# Patient Record
Sex: FEMALE | Race: OTHER
[De-identification: names, ages, dates, MRNs, and addresses within clinical notes are randomized per-mention and may not be internally consistent; named-entity substitution may affect disease eponyms.]

---

## 2020-10-13 ENCOUNTER — HOSPITAL ENCOUNTER (EMERGENCY)
Dept: HOSPITAL 56 - MW.ED | Age: 20
Discharge: HOME | End: 2020-10-13
Payer: MEDICAID

## 2020-10-13 DIAGNOSIS — Z32.01: Primary | ICD-10-CM

## 2020-10-13 NOTE — EDM.PDOC
ED HPI GENERAL MEDICAL PROBLEM





- General


Chief Complaint: OB/GYN Problem


Stated Complaint: PELVIC PAIN


Time Seen by Provider: 10/13/20 12:26


Source of Information: Reports: Patient


History Limitations: Reports: No Limitations, Language Barrier (Interview 

accomplished with the assistance of a trained )





- History of Present Illness


INITIAL COMMENTS - FREE TEXT/NARRATIVE: 





Presents reporting pregnancy.  The patient states her LMP was September 5, 2020.

 Yesterday she took 3 pregnancy tests and they were all positive.  She has been 

sexually active not on birth control.  Today she would like confirmation of 

pregnancy.  She also reports that she has been having "period cramping" more 

intense than usual off and on.  No vaginal bleeding, pelvic pain or abdominal 

pain.  Mild nausea but no vomiting.  No fever, dysuria.  She is otherwise 

healthy without chronic medical problems.  She has never been pregnant before.


  ** pelvic


Pain Score (Numeric/FACES): 7





- Related Data


                                    Allergies











Allergy/AdvReac Type Severity Reaction Status Date / Time


 


No Known Allergies Allergy   Verified 10/13/20 12:24











Home Meds: 


                                    Home Meds





. [No Known Home Meds]  10/13/20 [History]











Past Medical History





- Past Health History


Medical/Surgical History: Denies Medical/Surgical History





- Infectious Disease History


Infectious Disease History: Reports: None





Social & Family History





- Family History


Family Medical History: Noncontributory





- Tobacco Use


Tobacco Use Status *Q: Never Tobacco User





- Recreational Drug Use


Recreational Drug Use: No





ED ROS GENERAL





- Review of Systems


Review Of Systems: Comprehensive ROS is negative, except as noted in HPI.





ED EXAM PREGNANCY





- Physical Exam


Exam: See Below


Exam Limited By: No Limitations


General Appearance: Alert, No Apparent Distress


Ears: Normal External Exam


Nose: Normal Inspection


Throat/Mouth: Normal Inspection


Head: Atraumatic, Normocephalic


Neck: Normal Inspection


Respiratory/Chest: No Respiratory Distress, Lungs Clear, Normal Breath Sounds


Cardiovascular: Normal Peripheral Pulses, Regular Rate, Rhythm


GI/Abdominal Exam: Normal Bowel Sounds, Soft, No Distention, Tender (Mild in 

pelvic and epigastric area)





Course





- Vital Signs


Last Recorded V/S: 


                                Last Vital Signs











Temp      


 


Pulse  122 H  10/13/20 12:25


 


Resp  18   10/13/20 12:25


 


BP  129/74   10/13/20 12:25


 


Pulse Ox  100   10/13/20 12:25














- Orders/Labs/Meds


Labs: 


                                Laboratory Tests











  10/13/20 10/13/20 Range/Units





  12:43 12:43 


 


Urine Color  YELLOW   


 


Urine Appearance  CLEAR   


 


Urine pH  8.0   (5.0-8.0)  


 


Ur Specific Gravity  1.020   (1.001-1.035)  


 


Urine Protein  NEGATIVE   (NEGATIVE)  mg/dL


 


Urine Glucose (UA)  NEGATIVE   (NEGATIVE)  mg/dL


 


Urine Ketones  NEGATIVE   (NEGATIVE)  mg/dL


 


Urine Occult Blood  NEGATIVE   (NEGATIVE)  


 


Urine Nitrite  NEGATIVE   (NEGATIVE)  


 


Urine Bilirubin  NEGATIVE   (NEGATIVE)  


 


Urine Urobilinogen  0.2   (<2.0)  EU/dL


 


Ur Leukocyte Esterase  NEGATIVE   (NEGATIVE)  


 


Urine RBC  0-2   (0-2/HPF)  


 


Urine WBC  0-2   (0-5/HPF)  


 


Ur Epithelial Cells  OCCASIONAL   (NONE-FEW)  


 


Urine Bacteria  RARE   (NEGATIVE)  


 


Urine HCG, Qual   POSITIVE  (NEGATIVE)  














- Re-Assessments/Exams


Free Text/Narrative Re-Assessment/Exam: 





10/13/20 13:27


Now, the patient states that she is not having cramping all the time just once 

in a while she gets a cramp.





Departure





- Departure


Time of Disposition: 13:28


Disposition: Home, Self-Care 01


Condition: Good


Clinical Impression: 


 Pregnancy confirmed by positive urine test








- Discharge Information


Referrals: 


PCP,None [Primary Care Provider] - 


[facility] [Outside]


Yuriy Krishnamurthy MD [Physician] - 


Forms:  ED Department Discharge


Additional Instructions: 


The following information is given to patients seen in the emergency department 

who are being discharged to home. This information is to outline your options 

for follow-up care. We provide all patients seen in our emergency department 

with a follow-up referral.





The need for follow-up, as well as the timing and circumstances, are variable 

depending upon the specifics of your emergency department visit.





If you don't have a primary care physician on staff, we will provide you with a 

referral. We always advise you to contact your personal physician following an 

emergency department visit to inform them of the circumstance of the visit and 

for follow-up with them and/or the need for any referrals to a consulting 

specialist.





The emergency department will also refer you to a specialist when appropriate. 

This referral assures that you have the opportunity for follow-up care with a 

specialist. All of these measure are taken in an effort to provide you with 

optimal care, which includes your follow-up.





Under all circumstances we always encourage you to contact your private 

physician who remains a resource for coordinating your care. When calling for 

follow-up care, please make the office aware that this follow-up is from your 

recent emergency room visit. If for any reason you are refused follow-up, please

 contact the [facility] Emergency 

Department at [phone] and asked to speak to the emergency department 

charge nurse.





1.  If you experience vaginal bleeding along with pelvic pain, report to the 

emergency room promptly


2.  Make an appointment for prenatal care with one of the providers listed 

above.





Sepsis Event Note (ED)





- Evaluation


Sepsis Screening Result: No Definite Risk





- Focused Exam


Vital Signs: 


                                   Vital Signs











  Pulse Resp BP Pulse Ox


 


 10/13/20 12:25  122 H  18  129/74  100

## 2020-10-23 ENCOUNTER — HOSPITAL ENCOUNTER (EMERGENCY)
Dept: HOSPITAL 56 - MW.ED | Age: 20
LOS: 1 days | Discharge: HOME | End: 2020-10-24
Payer: SELF-PAY

## 2020-10-23 DIAGNOSIS — O20.0: Primary | ICD-10-CM

## 2020-10-23 LAB
BUN SERPL-MCNC: 9 MG/DL (ref 7–18)
CHLORIDE SERPL-SCNC: 101 MMOL/L (ref 98–107)
CO2 SERPL-SCNC: 26.7 MMOL/L (ref 21–32)
GLUCOSE SERPL-MCNC: 90 MG/DL (ref 74–106)
POTASSIUM SERPL-SCNC: 3.9 MMOL/L (ref 3.5–5.1)
SODIUM SERPL-SCNC: 135 MMOL/L (ref 136–145)

## 2020-10-23 PROCEDURE — 86901 BLOOD TYPING SEROLOGIC RH(D): CPT

## 2020-10-23 PROCEDURE — 76817 TRANSVAGINAL US OBSTETRIC: CPT

## 2020-10-23 PROCEDURE — 84702 CHORIONIC GONADOTROPIN TEST: CPT

## 2020-10-23 PROCEDURE — 81001 URINALYSIS AUTO W/SCOPE: CPT

## 2020-10-23 PROCEDURE — 86900 BLOOD TYPING SEROLOGIC ABO: CPT

## 2020-10-23 PROCEDURE — 36415 COLL VENOUS BLD VENIPUNCTURE: CPT

## 2020-10-23 PROCEDURE — 86850 RBC ANTIBODY SCREEN: CPT

## 2020-10-23 PROCEDURE — 80053 COMPREHEN METABOLIC PANEL: CPT

## 2020-10-23 PROCEDURE — 99284 EMERGENCY DEPT VISIT MOD MDM: CPT

## 2020-10-23 PROCEDURE — 85025 COMPLETE CBC W/AUTO DIFF WBC: CPT

## 2020-10-23 NOTE — US
INDICATION:



Vaginal bleeding



TECHNIQUE:



Ultrasound OB pelvis transvaginal.  Real-time gray-scale imaging of the 

pelvis was performed.



COMPARISON:



None available



FINDINGS:



There is an intrauterine gestational sac containing a fetal pole with a 

crown-rump length of 2.6 mm, corresponding to 5 weeks and 6 days. A yolk 

sac is seen, measuring 4-5 mm. There is fetal cardiac activity with a heart 

rate of 110 BPM. There is a small irregular hypoechoic area adjacent to the 

gestational sac suggestive of a subchorionic hemorrhage. The right ovary 

measures 2.6 x 1.9 x 1.1 cm and the left ovary measures 3.1 x 2.5 x 2.3 cm. 

There is a probable corpus luteum in the left ovary. No significant free 

fluid is seen. 



IMPRESSION:



A single live early intrauterine gestation at 5 weeks and 6 days by 

crown-rump length. Relative fetal bradycardia could be related to the early 

gestational age. An apparent small subchorionic hemorrhage. Recommend close 

follow-up.



Dictated by Carlos Andrea MD @ Oct 23 2020 11:15PM



Signed by Dr. Carlos Andrea @ Oct 23 2020 11:21PM

## 2021-02-19 NOTE — EDM.PDOC
ED HPI GENERAL MEDICAL PROBLEM





- General


Chief Complaint: OB/GYN Problem


Stated Complaint: VAGINAL BLEEDING


Time Seen by Provider: 10/23/20 23:09


Source of Information: Reports: Patient


History Limitations: Reports: No Limitations





- History of Present Illness


INITIAL COMMENTS - FREE TEXT/NARRATIVE: 





19-year-old female , LMP 9/10/2020 presents with vaginal bleeding that star

porfirio tonight.  She denies fever, chills, vomiting, dysuria, passing clots, pelvic

pain.  She admits to nausea.  She has an appointment with [facility] on 

Monday.  Presents with vaginal bleeding





ROS: A 10-point review of systems, other than pertinent positives and negatives 

as stated per HPI, is otherwise negative





Past medical history: No additional pertinent history


Past Surgical history: No additional pertinent history


Social history: No additional pertinent history


Family history: No additional pertinent history


________________________________________________________________________________





PHYSICAL EXAM





General: AOx4, GCS = 15, No distress


HEENT: dry mucous membrane


Neck: supple, no meningismus, no Kernig or Brudzinski


Cardiac: S1S2 RRR


Respiratory: CTAB, no crackles or rales, no wheezing


Abdomen: Soft, nontender, no rebound or guarding, nondistended, no pulsatile 

mass.


Back: nontender


: deferred


Musculoskeletal: NVI distally, no deformity


Neuro: No focal deficits, CN 2 - 12 WNL.











- Related Data


                                    Allergies











Allergy/AdvReac Type Severity Reaction Status Date / Time


 


No Known Allergies Allergy   Verified 10/23/20 23:40











Home Meds: 


                                    Home Meds





. [No Known Home Meds]  10/23/20 [History]











ED ROS GENERAL





- Review of Systems


Review Of Systems: Comprehensive ROS is negative, except as noted in HPI. (see 

dictation)





ED EXAM, GENERAL





- Physical Exam


Exam: See Below (see dictation)





Course





- Vital Signs


Last Recorded V/S: 


                                Last Vital Signs











Temp  96.7 F L  10/24/20 00:54


 


Pulse  107 H  10/24/20 00:54


 


Resp  17   10/24/20 00:54


 


BP  100/68   10/24/20 00:54


 


Pulse Ox  99   10/23/20 23:21














- Orders/Labs/Meds


Labs: 


                                Laboratory Tests











  10/23/20 10/23/20 10/23/20 Range/Units





  22:00 22:00 22:00 


 


WBC   10.89   (4.0-11.0)  K/uL


 


RBC   4.49   (4.30-5.90)  M/uL


 


Hgb   13.8   (12.0-16.0)  g/dL


 


Hct   41.0   (36.0-46.0)  %


 


MCV   91.3   (80.0-98.0)  fL


 


MCH   30.7   (27.0-32.0)  pg


 


MCHC   33.7   (31.0-37.0)  g/dL


 


RDW Std Deviation   43.0   (28.0-62.0)  fl


 


RDW Coeff of Vinny   13   (11.0-15.0)  %


 


Plt Count   294   (150-400)  K/uL


 


MPV   9.30   (7.40-12.00)  fL


 


Neut % (Auto)   65.0   (48.0-80.0)  %


 


Lymph % (Auto)   25.3   (16.0-40.0)  %


 


Mono % (Auto)   9.0   (0.0-15.0)  %


 


Eos % (Auto)   0.5   (0.0-7.0)  %


 


Baso % (Auto)   0.2   (0.0-1.5)  %


 


Neut # (Auto)   7.1 H   (1.4-5.7)  K/uL


 


Lymph # (Auto)   2.8 H   (0.6-2.4)  K/uL


 


Mono # (Auto)   1.0 H   (0.0-0.8)  K/uL


 


Eos # (Auto)   0.1   (0.0-0.7)  K/uL


 


Baso # (Auto)   0.0   (0.0-0.1)  K/uL


 


Nucleated RBC %   0.0   /100WBC


 


Nucleated RBCs #   0   K/uL


 


Sodium    135 L  (136-145)  mmol/L


 


Potassium    3.9  (3.5-5.1)  mmol/L


 


Chloride    101  ()  mmol/L


 


Carbon Dioxide    26.7  (21.0-32.0)  mmol/L


 


BUN    9  (7.0-18.0)  mg/dL


 


Creatinine    0.6  (0.6-1.0)  mg/dL


 


Est Cr Clr Drug Dosing    TNP  


 


Estimated GFR (MDRD)    > 60.0  ml/min


 


Glucose    90  ()  mg/dL


 


Calcium    8.8  (8.5-10.1)  mg/dL


 


Total Bilirubin    0.2  (0.2-1.0)  mg/dL


 


AST    16  (15-37)  IU/L


 


ALT    18  (14-63)  IU/L


 


Alkaline Phosphatase    73  ()  U/L


 


Total Protein    6.9  (6.4-8.2)  g/dL


 


Albumin    3.7  (3.4-5.0)  g/dL


 


Globulin    3.2  (2.6-4.0)  g/dL


 


Albumin/Globulin Ratio    1.2  (0.9-1.6)  


 


HCG, Quant     mIU/mL


 


Urine Color     


 


Urine Appearance     


 


Urine pH     (5.0-8.0)  


 


Ur Specific Gravity     (1.001-1.035)  


 


Urine Protein     (NEGATIVE)  mg/dL


 


Urine Glucose (UA)     (NEGATIVE)  mg/dL


 


Urine Ketones     (NEGATIVE)  mg/dL


 


Urine Occult Blood     (NEGATIVE)  


 


Urine Nitrite     (NEGATIVE)  


 


Urine Bilirubin     (NEGATIVE)  


 


Urine Urobilinogen     (<2.0)  EU/dL


 


Ur Leukocyte Esterase     (NEGATIVE)  


 


Urine RBC     (0-2/HPF)  


 


Urine WBC     (0-5/HPF)  


 


Ur Epithelial Cells     (NONE-FEW)  


 


Urine Bacteria     (NEGATIVE)  


 


Blood Type  O NEGATIVE    


 


Antibody Screen  NEGATIVE    


 


Rhogam Indicated  YES    














  10/23/20 10/23/20 Range/Units





  22:00 23:30 


 


WBC    (4.0-11.0)  K/uL


 


RBC    (4.30-5.90)  M/uL


 


Hgb    (12.0-16.0)  g/dL


 


Hct    (36.0-46.0)  %


 


MCV    (80.0-98.0)  fL


 


MCH    (27.0-32.0)  pg


 


MCHC    (31.0-37.0)  g/dL


 


RDW Std Deviation    (28.0-62.0)  fl


 


RDW Coeff of Vinny    (11.0-15.0)  %


 


Plt Count    (150-400)  K/uL


 


MPV    (7.40-12.00)  fL


 


Neut % (Auto)    (48.0-80.0)  %


 


Lymph % (Auto)    (16.0-40.0)  %


 


Mono % (Auto)    (0.0-15.0)  %


 


Eos % (Auto)    (0.0-7.0)  %


 


Baso % (Auto)    (0.0-1.5)  %


 


Neut # (Auto)    (1.4-5.7)  K/uL


 


Lymph # (Auto)    (0.6-2.4)  K/uL


 


Mono # (Auto)    (0.0-0.8)  K/uL


 


Eos # (Auto)    (0.0-0.7)  K/uL


 


Baso # (Auto)    (0.0-0.1)  K/uL


 


Nucleated RBC %    /100WBC


 


Nucleated RBCs #    K/uL


 


Sodium    (136-145)  mmol/L


 


Potassium    (3.5-5.1)  mmol/L


 


Chloride    ()  mmol/L


 


Carbon Dioxide    (21.0-32.0)  mmol/L


 


BUN    (7.0-18.0)  mg/dL


 


Creatinine    (0.6-1.0)  mg/dL


 


Est Cr Clr Drug Dosing    


 


Estimated GFR (MDRD)    ml/min


 


Glucose    ()  mg/dL


 


Calcium    (8.5-10.1)  mg/dL


 


Total Bilirubin    (0.2-1.0)  mg/dL


 


AST    (15-37)  IU/L


 


ALT    (14-63)  IU/L


 


Alkaline Phosphatase    ()  U/L


 


Total Protein    (6.4-8.2)  g/dL


 


Albumin    (3.4-5.0)  g/dL


 


Globulin    (2.6-4.0)  g/dL


 


Albumin/Globulin Ratio    (0.9-1.6)  


 


HCG, Quant  77037.0   mIU/mL


 


Urine Color   YELLOW  


 


Urine Appearance   SLT CLOUDY  


 


Urine pH   6.0  (5.0-8.0)  


 


Ur Specific Gravity   >= 1.030  (1.001-1.035)  


 


Urine Protein   NEGATIVE  (NEGATIVE)  mg/dL


 


Urine Glucose (UA)   NEGATIVE  (NEGATIVE)  mg/dL


 


Urine Ketones   NEGATIVE  (NEGATIVE)  mg/dL


 


Urine Occult Blood   SMALL H  (NEGATIVE)  


 


Urine Nitrite   NEGATIVE  (NEGATIVE)  


 


Urine Bilirubin   NEGATIVE  (NEGATIVE)  


 


Urine Urobilinogen   0.2  (<2.0)  EU/dL


 


Ur Leukocyte Esterase   NEGATIVE  (NEGATIVE)  


 


Urine RBC   0-2  (0-2/HPF)  


 


Urine WBC   0-2  (0-5/HPF)  


 


Ur Epithelial Cells   MANY  (NONE-FEW)  


 


Urine Bacteria   RARE  (NEGATIVE)  


 


Blood Type    


 


Antibody Screen    


 


Rhogam Indicated    














- Re-Assessments/Exams


Free Text/Narrative Re-Assessment/Exam: 





10/24/20 0059


After prolonged observation in the ER, the patient improved and is currently 

stable for discharge. I performed a repeat exam and did not appreciate new 

abnormal findings. Patient exhibits normal vital signs and has a normal gait on 

road test. I advised the patient to return to the ER for reevaluation if 

symptoms worsened, including fever, worsening pain, or any other worrisome 

symptoms. I instructed the patient to follow up with [facility] on Monday. 





________________________________________________________________________________





MEDICAL DECISION MAKING: I reviewed the patients past medical records, lab and 

radiographic findings. I discussed the case with the patient. My differential 

diagnosis included: Miscarriage, ectopic pregnancy, UTI.  Her UA did not 

demonstrate a UTI.  Her ultrasound demonstrated a single live IUP GA 5 weeks and

 6 days, with subchorionic hemorrhage.  No ectopic pregnancy noted.  I informed 

patient of the risk for threatened miscarriage and I educated her on pelvic 

rest.  Patient is Rh-, she was given RhoGam in the ER.




















Departure





- Departure


Time of Disposition: 23:11


Disposition: Home, Self-Care 01


Condition: Good


Clinical Impression: 


 Threatened 








- Discharge Information


*PRESCRIPTION DRUG MONITORING PROGRAM REVIEWED*: Not Applicable


*COPY OF PRESCRIPTION DRUG MONITORING REPORT IN PATIENT SALLIE: Not Applicable


Instructions:  Threatened Miscarriage, Activity Restriction During Pregnancy


Referrals: 


PCP,None [Primary Care Provider] - 


Forms:  ED Department Discharge


Additional Instructions: 


The need for follow-up, as well as the timing and circumstances, are variable 

depending upon the specifics of your emergency department visit.





If you don't have a primary care physician on staff, we will provide you with a 

referral. We always advise you to contact your personal physician following an 

emergency department visit to inform them of the circumstance of the visit and 

for follow-up with them and/or the need for any referrals to a consulting 

specialist.





The emergency department will also refer you to a specialist when appropriate. 

This referral assures that you have the opportunity for follow-up care with a 

specialist. All of these measure are taken in an effort to provide you with 

optimal care, which includes your follow-up.





Under all circumstances we always encourage you to contact your private 

physician who remains a resource for coordinating your care. When calling for 

follow-up care, please make the office aware that this follow-up is from your 

recent emergency room visit. If for any reason you are refused follow-up, please

contact the [facility] Emergency Department

at [phone] and asked to speak to the emergency department charge nurse.





If you do not have a primary care doctor, please follow up with the clinics 

below within 3-5 days. 


OB/GYN clinics


[facility]


[address] [place] [id number]


Phone: [phone]


Fax: [phone]





[facility]


[address] [place] [address]


Phone: [phone]


Fax: [phone]








Sepsis Event Note (ED)





- Focused Exam


Vital Signs: 


                                   Vital Signs











  Temp Temp Pulse Resp BP Pulse Ox


 


 10/24/20 00:54  96.7 F L   107 H  17  100/68 


 


 10/24/20 00:47  96.7 F L   94  17  110/67 


 


 10/23/20 23:21   96.8 F L  107 H  17  105/61  99
skin normal color for race, warm, dry and intact.

## 2021-06-07 ENCOUNTER — HOSPITAL ENCOUNTER (INPATIENT)
Dept: HOSPITAL 56 - MW.OBCHECK | Age: 21
LOS: 2 days | Discharge: HOME | End: 2021-06-09
Attending: OBSTETRICS & GYNECOLOGY | Admitting: OBSTETRICS & GYNECOLOGY
Payer: MEDICAID

## 2021-06-07 DIAGNOSIS — Z3A.38: ICD-10-CM

## 2021-06-07 PROCEDURE — 4A1HXCZ MONITORING OF PRODUCTS OF CONCEPTION, CARDIAC RATE, EXTERNAL APPROACH: ICD-10-PCS | Performed by: OBSTETRICS & GYNECOLOGY

## 2021-06-07 PROCEDURE — U0002 COVID-19 LAB TEST NON-CDC: HCPCS

## 2021-06-07 PROCEDURE — 10907ZC DRAINAGE OF AMNIOTIC FLUID, THERAPEUTIC FROM PRODUCTS OF CONCEPTION, VIA NATURAL OR ARTIFICIAL OPENING: ICD-10-PCS | Performed by: OBSTETRICS & GYNECOLOGY

## 2021-06-07 NOTE — PCM.PRNOTE
- Free Text/Narrative


Note: 





Anes Note





Patient requests epidural fo L&D. Sitting position. Level L3-L4 midline 

approach. Sterile technique. Chloaprep scrub to lumbar area.





Sterile fenestrated drape applied. Epidural space easily achieved single attempt

with ease using DYLAN technique. 





DYLAN at 3 cm. Cah threaded 5 cm with ease. Cath secured a t skin using sterile 

clear adhesive dressing.





1610 Test 3 cc 1.5% lido with epi negative.





1614 Load 10 cc 0.2% ropiviciane with 1 mcg cc fentanyl in slow divided doses.





1617 Pump started with 190 cc same solution. Rate is 8 cc hr with 6 cc q 20 min 

prn bolus. 





Rogelio well.





Time with patient [id number]





Fadi Ornelas CRNA

## 2021-06-07 NOTE — PCM.PREANE
Preanesthetic Assessment





- Anesthesia/Transfusion/Family Hx


Anesthesia History: No Prior Anesthesia


Family History of Anesthesia Reaction: No





- Physical Assessment


NPO Status Date: 06/07/21


NPO Status Time: 11:00


Height: 1.61 m


Weight: 58.06 kg


ASA Class: 2





- Lab


Values: 





                             Laboratory Last Values











WBC  14.31 K/uL (4.0-11.0)  H  06/07/21  14:30    


 


RBC  4.18 M/uL (4.30-5.90)  L  06/07/21  14:30    


 


Hgb  12.4 g/dL (12.0-16.0)   06/07/21  14:30    


 


Hct  37.0 % (36.0-46.0)   06/07/21  14:30    


 


MCV  88.5 fL (80.0-98.0)   06/07/21  14:30    


 


MCH  29.7 pg (27.0-32.0)   06/07/21  14:30    


 


MCHC  33.5 g/dL (31.0-37.0)   06/07/21  14:30    


 


RDW Std Deviation  44.3 fl (28.0-62.0)   06/07/21  14:30    


 


RDW Coeff of Alex  14 % (11.0-15.0)   06/07/21  14:30    


 


Plt Count  315 K/uL (150-400)   06/07/21  14:30    


 


MPV  10.60 fL (7.40-12.00)   06/07/21  14:30    


 


Nucleated RBC %  0.0 /100WBC  06/07/21  14:30    


 


Nucleated RBCs #  0 K/uL  06/07/21  14:30    


 


SARS-CoV-2 RNA (JEREMIE)  NEGATIVE  (NEGATIVE)   06/07/21  14:30    














- Allergies


Allergies/Adverse Reactions: 


                                    Allergies











Allergy/AdvReac Type Severity Reaction Status Date / Time


 


No Known Allergies Allergy   Verified 03/18/21 09:14














- Acknowledgements


Anesthesia Type Planned: Epidural


Pt an Appropriate Candidate for the Planned Anesthesia: Yes


Alternatives and Risks of Anesthesia Discussed w Pt/Guardian: Yes


Pt/Guardian Understands and Agrees with Anesthesia Plan: Yes





PreAnesthesia Questionnaire





- Past Health History


Medical/Surgical History: Denies Medical/Surgical History


OB/GYN History: Reports: Pregnancy





- Infectious Disease History


Infectious Disease History: Reports: None





- SUBSTANCE USE


Tobacco Use Status *Q: Never Tobacco User


Second Hand Smoke Exposure: No


Recreational Drug Use History: No





- HOME MEDS


Home Medications: 


                                    Home Meds





. [No Known Home Meds]  10/13/20 [History]


Pnv No.95/Ferrous Fum/Folic AC [Prenatal Multivitamin Tablet] 1 each PO DAILY 

03/17/21 [History]











- CURRENT (IN HOUSE) MEDS


Current Meds: 





                               Current Medications





Butorphanol Tartrate (Butorphanol 1 Mg/Ml Sdv)  1 mg IVPUSH Q1H PRN


   PRN Reason: Pain (severe 7-10)


Carboprost Tromethamine (Carboprost Tromethamine 250 Mcg/1 Ml Amp)  250 mcg IM 

ASDIRECTED PRN


   PRN Reason: Post Partum Hemorrhage


Oxytocin/Sodium Chloride (Oxytocin 30 Unit/500 Ml-Ns)  30 unit in 500 mls @ 500 

mls/hr IV TITRATE CORTEZ


Tranexamic Acid 1,000 mg/ (Sodium Chloride)  110 mls @ 660 mls/hr IV ONETIME PRN


   PRN Reason: Bleeding


Lactated Ringer's (Ringers, Lactated)  1,000 mls @ 150 mls/hr IV ASDIRECTED [facility]


   Last Infusion: 06/07/21 15:35 Dose:  999 mls/hr


   Documented by: 


Lidocaine HCl (Lidocaine 1% 50 Ml Mdv)  50 ml INJECT ONETIME PRN


   PRN Reason: Laceration repair


Methylergonovine Maleate (Methylergonovine 0.2 Mg/1 Ml Amp)  0.2 mg IM 

ASDIRECTED PRN


   PRN Reason: Post Partum Hemorrhage


Misoprostol (Misoprostol 200 Mcg Tab)  200 mcg PO ONETIME PRN


   PRN Reason: Post Partum Hemorrhage


Nalbuphine HCl (Nalbuphine 10 Mg/1 Ml Vial)  10 mg IVPUSH Q1H PRN


   PRN Reason: Pain (severe 7-10)


Sodium Chloride (Sodium Chloride 0.9% 10 Ml Syringe)  10 ml FLUSH ASDIRECTED PRN


   PRN Reason: Keep Vein Open


Sodium Chloride (Sodium Chloride 0.9% 2.5 Ml Syringe)  2.5 ml FLUSH ASDIRECTED 

PRN


   PRN Reason: Keep Vein Open


Sodium Chloride (Sodium Chloride 0.9% 10 Ml Sdv)  10 ml IV ASDIRECTED PRN


   PRN Reason: IV Use


Sterile Water (Water For Irrigation,Sterile 1,000 Ml Container)  1,000 ml IRR 

ASDIRECTED PRN


   PRN Reason: delivery





Discontinued Medications





Ampicillin Sodium (Ampicillin 2 Gm Vial) Confirm Administered Dose 2 gm .ROUTE 

.STK-MED ONE


   Stop: 06/07/21 14:50


Fentanyl (Fentanyl 100 Mcg/2 Ml Sdv) Confirm Administered Dose 200 mcg .ROUTE 

.STK-MED ONE


   Stop: 06/07/21 15:56


Ampicillin Sodium 2 gm/ Sodium (Chloride)  100 mls @ 200 mls/hr IV ONETIME ONE


   Stop: 06/07/21 14:51


   Last Admin: 06/07/21 14:50 Dose:  200 mls/hr


   Documented by: 


Ropivacaine (Naropin 0.2%) Confirm Administered Dose 200 mls @ as directed 

.ROUTE .STK-MED ONE


   Stop: 06/07/21 15:56

## 2021-06-07 NOTE — PCM.DEL
L & D Note





- General Info


Date of Service: 21





- Delivery Note


Delivery Outcome: Livebirth


Infant Delivery Method: Spontaneous Vaginal Delivery-Single


Birth Presentation: Left Occiput Anterior (CHASTITY)


Nuchal Cord: None


Anesthesia Type: Epidural


Amniotic Fluid Description: Clear


Episiotomy Type: None


Laceration: Labial


Suture type: Vicryl


Suture size: 3-0


Placenta: Intact


Cord: 3 Vessels


Estimated Blood Loss: 300


Resuscitation Needed: No


APGAR Score 1 min: 9


APGAR Score 5 min: 9


Delivery Comments (Free Text/Narrative):: 


Live male  delivered at  , apgar 9/9 weight pending 








- General Info


Date of Service: 21





- Patient Data


Weight - Most Recent: 58.06 kg


Lab Results Last 24 Hours: 


                         Laboratory Results - last 24 hr











  21 Range/Units





  14:30 14:30 14:30 


 


WBC  14.31 H    (4.0-11.0)  K/uL


 


RBC  4.18 L    (4.30-5.90)  M/uL


 


Hgb  12.4    (12.0-16.0)  g/dL


 


Hct  37.0    (36.0-46.0)  %


 


MCV  88.5    (80.0-98.0)  fL


 


MCH  29.7    (27.0-32.0)  pg


 


MCHC  33.5    (31.0-37.0)  g/dL


 


RDW Std Deviation  44.3    (28.0-62.0)  fl


 


RDW Coeff of Alex  14    (11.0-15.0)  %


 


Plt Count  315    (150-400)  K/uL


 


MPV  10.60    (7.40-12.00)  fL


 


Nucleated RBC %  0.0    /100WBC


 


Nucleated RBCs #  0    K/uL


 


SARS-CoV-2 RNA ([place])    NEGATIVE  (NEGATIVE)  


 


Blood Type   O NEGATIVE   


 


Antibody Screen   POSITIVE   


 


Antibody Identification   Anti-D   











Med Orders - Current: 


                               Current Medications





Butorphanol Tartrate (Butorphanol 1 Mg/Ml Sdv)  1 mg IVPUSH Q1H PRN


   PRN Reason: Pain (severe 7-10)


Carboprost Tromethamine (Carboprost Tromethamine 250 Mcg/1 Ml Amp)  250 mcg IM 

ASDIRECTED PRN


   PRN Reason: Post Partum Hemorrhage


Oxytocin/Sodium Chloride (Oxytocin 30 Unit/500 Ml-Ns)  30 unit in 500 mls @ 500 

mls/hr IV TITRATE [facility]


   Last Admin: 21 20:05 Dose:  500 mls/hr


   Documented by: 


Lactated Ringer's (Ringers, Lactated)  1,000 mls @ 150 mls/hr IV ASDIRECTED [facility]


   Last Infusion: 21 18:00 Dose:  150 mls/hr


   Documented by: 


Lidocaine HCl (Lidocaine 1% 50 Ml Mdv)  50 ml INJECT ONETIME PRN


   PRN Reason: Laceration repair


Sodium Chloride (Sodium Chloride 0.9% 10 Ml Syringe)  10 ml FLUSH ASDIRECTED PRN


   PRN Reason: Keep Vein Open


Sodium Chloride (Sodium Chloride 0.9% 2.5 Ml Syringe)  2.5 ml FLUSH ASDIRECTED 

PRN


   PRN Reason: Keep Vein Open


Sodium Chloride (Sodium Chloride 0.9% 10 Ml Sdv)  10 ml IV ASDIRECTED PRN


   PRN Reason: IV Use


Sterile Water (Water For Irrigation,Sterile 1,000 Ml Container)  1,000 ml IRR 

ASDIRECTED PRN


   PRN Reason: delivery





Discontinued Medications





Ampicillin Sodium (Ampicillin 2 Gm Vial) Confirm Administered Dose 2 gm .ROUTE 

.STK-MED ONE


   Stop: 21 14:50


Fentanyl (Fentanyl 100 Mcg/2 Ml Sdv) Confirm Administered Dose 200 mcg .ROUTE 

.STK-MED ONE


   Stop: 21 15:56


Tranexamic Acid 1,000 mg/ (Sodium Chloride)  110 mls @ 660 mls/hr IV ONETIME PRN


   PRN Reason: Bleeding


Ampicillin Sodium 2 gm/ Sodium (Chloride)  100 mls @ 200 mls/hr IV ONETIME ONE


   Stop: 21 14:51


   Last Admin: 21 14:50 Dose:  200 mls/hr


   Documented by: 


Ropivacaine (Naropin 0.2%) Confirm Administered Dose 200 mls @ as directed 

.ROUTE .STK-MED ONE


   Stop: 21 15:56


Ampicillin Sodium 1 gm/ Sodium (Chloride)  50 mls @ 100 mls/hr IV Q4H [facility]


   Last Admin: 21 18:52 Dose:  100 mls/hr


   Documented by: 


Methylergonovine Maleate (Methylergonovine 0.2 Mg/1 Ml Amp)  0.2 mg IM 

ASDIRECTED PRN


   PRN Reason: Post Partum Hemorrhage


Misoprostol (Misoprostol 200 Mcg Tab)  200 mcg PO ONETIME PRN


   PRN Reason: Post Partum Hemorrhage


Nalbuphine HCl (Nalbuphine 10 Mg/1 Ml Vial)  10 mg IVPUSH Q1H PRN


   PRN Reason: Pain (severe 7-10)











- Problem List & Annotations


(1) Vaginal delivery


SNOMED Code(s): 592643985


   Code(s): O80 - ENCOUNTER FOR FULL-TERM UNCOMPLICATED DELIVERY   Status: Acute

  Current Visit: Yes   





- Problem List Review


Problem List Initiated/Reviewed/Updated: Yes





- My Orders


Last 24 Hours: 


My Active Orders





21 14:22


Patient Status [ADT] Routine 


May Shower [RC] ASDIRECTED 


Notify Provider [RC] PRN 


Up ad Vivian [RC] ASDIRECTED 


Vaginal Exam [RC] PRN 


Vital Signs [RC] PER UNIT ROUTINE 


Butorphanol [Stadol]   1 mg IVPUSH Q1H PRN 


Carboprost Tromethamine [Hemabate DS]   250 mcg IM ASDIRECTED PRN 


Lidocaine 1% [Xylocaine 1%]   50 ml INJECT ONETIME PRN 


Sodium Chloride 0.9% [Normal Saline]   10 ml IV ASDIRECTED PRN 


Sodium Chloride 0.9% [Saline Flush]   10 ml FLUSH ASDIRECTED PRN 


Sodium Chloride 0.9% [Saline Flush]   2.5 ml FLUSH ASDIRECTED PRN 


Water For Irrigation,Sterile [Sterile Water for Irrigation]   1,000 ml IRR 

ASDIRECTED PRN 


Peripheral IV Insertion Adult [OM.PC] Routine 





21 14:30


RPR (SYPHILIS SERO) W/ RFLX [REF] Routine 


Lactated Ringers [Ringers, Lactated] 1,000 ml IV ASDIRECTED 


Oxytocin/0.9 % Sodium Chloride [Oxytocin 30 Unit/500 ML-NS] 30 unit in 500 ml IV

TITRATE 





21 20:42


Patient Status [ADT] Routine 


May Shower [RC] ASDIRECTED 


Up ad Vivian [RC] ASDIRECTED 


Vital Signs [RC] PER UNIT ROUTINE 


RHIG WORKUP, POSTPARTUM [BBK] Routine 


Acetaminophen [Tylenol Extra Strength]   1,000 mg PO Q4H PRN 


Acetaminophen [Tylenol Extra Strength]   500 mg PO Q4H PRN 


Benzocaine/Menthol [Dermoplast Pain Relief 20%-0.5% Spray]   78 gm TOP 

ASDIRECTED PRN 


Docusate Sodium [Colace]   100 mg PO Q12H PRN 


Ibuprofen [Motrin]   400 mg PO Q4H PRN 


Ibuprofen [Motrin]   800 mg PO Q6H PRN 


Lanolin [Lansinoh HPA]   See Dose Instructions  TOP ASDIRECTED PRN 


bisacodyL [Dulcolax]   10 mg RECTAL ONETIME PRN 


oxyCODONE   5 mg PO Q2H PRN 


witch hazeL [Tucks]   1 pad TOP ASDIRECTED PRN 


Assess Lochia [WOMSER] Per Unit Routine 


Assess Uterine Involution [WOMSER] Per Unit Routine 


Peripheral IV Discontinue [OM.PC] Routine 


Resuscitation Status Routine 





21 05:11


HEMOGLOBIN/HEMATOCRIT,HH [HEME] Timed 














- Assessment


Assessment:: 


21yo P1 s/p  PPD0 , GBS positive received ampicillin 


 Rh negative

## 2021-06-08 NOTE — [id number]
Post Anesthesia Note





- EVALUATION WITHIN 48HRS OF ANESTHETIC


Vital Signs in Normal Range: Yes


Patient Participated in Evaluation: Yes


Respiratory Function Stable: Yes


Airway Patent: Yes


Cardiovascular Function Stable: Yes


Hydration Status Stable: Yes


Pain Control Satisfactory: Yes


Nausea and Vomiting Control Satisfactory: Yes


Mental Status Recovered: Yes


Vital Signs: 


                                Last Vital Signs











Temp  36.6 C   06/08/21 04:10


 


Pulse  93   06/08/21 04:10


 


Resp  16   06/08/21 04:10


 


BP  105/67   06/08/21 04:10


 


Pulse Ox  95   06/08/21 04:10

## 2021-06-08 NOTE — PCM.PNPP
- General Info


Date of Service: 21


Admission Dx/Problem (Free Text): 





Pregnanc


Subjective Update: 





Resting in bed with infant during rounds. Pain well controlled. Ambulating and 

voiding without difficulty. Lochia decreasing. Tolerating regular diet. 

Attempting to breastfeed, working on latch.





- General Info


Date of Service: 21





- Patient Data


Vital Signs - Most Recent: 


                                Last Vital Signs











Temp  98 F   21 04:10


 


Pulse  93   21 04:10


 


Resp  16   21 04:10


 


BP  105/67   21 04:10


 


Pulse Ox  95   21 04:10











Weight - Most Recent: 128 lb


I&O - Last 24 Hours: 


                                 Intake & Output











 21





 22:59 06:59 14:59


 


Intake Total  2 


 


Balance  2 











Lab Results - Last 24 Hours: 


                         Laboratory Results - last 24 hr











  21 Range/Units





  14:30 14:30 14:30 


 


WBC  14.31 H    (4.0-11.0)  K/uL


 


RBC  4.18 L    (4.30-5.90)  M/uL


 


Hgb  12.4    (12.0-16.0)  g/dL


 


Hct  37.0    (36.0-46.0)  %


 


MCV  88.5    (80.0-98.0)  fL


 


MCH  29.7    (27.0-32.0)  pg


 


MCHC  33.5    (31.0-37.0)  g/dL


 


RDW Std Deviation  44.3    (28.0-62.0)  fl


 


RDW Coeff of Alex  14    (11.0-15.0)  %


 


Plt Count  315    (150-400)  K/uL


 


MPV  10.60    (7.40-12.00)  fL


 


Nucleated RBC %  0.0    /100WBC


 


Nucleated RBCs #  0    K/uL


 


Cord ABG pH     (7.18-7.38)  


 


Cord ABG Base Excess     (-10--2)  


 


Cord VBG pH     (7.25-7.45)  


 


Cord VBG Base Excess     (-10--2)  


 


SARS-CoV-2 RNA ([place])    NEGATIVE  (NEGATIVE)  


 


Blood Type   O NEGATIVE   


 


Antibody Screen   POSITIVE   


 


Antibody Identification   Anti-D   


 


Fetal Screen     (NEGATIVE)  


 


RhIG Candidate?     


 


Rhogam Indicated     














  21 Range/Units





  20:04 20:04 21:03 


 


WBC     (4.0-11.0)  K/uL


 


RBC     (4.30-5.90)  M/uL


 


Hgb     (12.0-16.0)  g/dL


 


Hct     (36.0-46.0)  %


 


MCV     (80.0-98.0)  fL


 


MCH     (27.0-32.0)  pg


 


MCHC     (31.0-37.0)  g/dL


 


RDW Std Deviation     (28.0-62.0)  fl


 


RDW Coeff of Alex     (11.0-15.0)  %


 


Plt Count     (150-400)  K/uL


 


MPV     (7.40-12.00)  fL


 


Nucleated RBC %     /100WBC


 


Nucleated RBCs #     K/uL


 


Cord ABG pH  7.267    (7.18-7.38)  


 


Cord ABG Base Excess  -4    (-10--2)  


 


Cord VBG pH   7.307   (7.25-7.45)  


 


Cord VBG Base Excess   -4   (-10--2)  


 


SARS-CoV-2 RNA ([place])     (NEGATIVE)  


 


Blood Type     


 


Antibody Screen     


 


Antibody Identification     


 


Fetal Screen    NEGATIVE  (NEGATIVE)  


 


RhIG Candidate?    YES  


 


Rhogam Indicated    YES, BABY RH POS H  














  21 Range/Units





  05:18 


 


WBC   (4.0-11.0)  K/uL


 


RBC   (4.30-5.90)  M/uL


 


Hgb  10.7 L  (12.0-16.0)  g/dL


 


Hct  31.9 L  (36.0-46.0)  %


 


MCV   (80.0-98.0)  fL


 


MCH   (27.0-32.0)  pg


 


MCHC   (31.0-37.0)  g/dL


 


RDW Std Deviation   (28.0-62.0)  fl


 


RDW Coeff of Alex   (11.0-15.0)  %


 


Plt Count   (150-400)  K/uL


 


MPV   (7.40-12.00)  fL


 


Nucleated RBC %   /100WBC


 


Nucleated RBCs #   K/uL


 


Cord ABG pH   (7.18-7.38)  


 


Cord ABG Base Excess   (-10--2)  


 


Cord VBG pH   (7.25-7.45)  


 


Cord VBG Base Excess   (-10--2)  


 


SARS-CoV-2 RNA ([place])   (NEGATIVE)  


 


Blood Type   


 


Antibody Screen   


 


Antibody Identification   


 


Fetal Screen   (NEGATIVE)  


 


RhIG Candidate?   


 


Rhogam Indicated   











Med Orders - Current: 


                               Current Medications





Acetaminophen (Acetaminophen 500 Mg Tab)  500 mg PO Q4H PRN


   PRN Reason: Pain (mild 1-3)


Acetaminophen (Acetaminophen 500 Mg Tab)  1,000 mg PO Q4H PRN


   PRN Reason: Pain (mild 1-3)


Benzocaine/Menthol (Benzocaine/Menthol 20%-0.5% Spray 78 Gm Cannister)  78 gm 

TOP ASDIRECTED PRN


   PRN Reason: Perineal Comfort Measure


   Last Admin: 21 23:38 Dose:  78 gm


   Documented by: 


Bisacodyl (Bisacodyl 10 Mg Supp)  10 mg RECTAL ONETIME PRN


   PRN Reason: Constipation


Butorphanol Tartrate (Butorphanol 1 Mg/Ml Sdv)  1 mg IVPUSH Q1H PRN


   PRN Reason: Pain (severe 7-10)


Carboprost Tromethamine (Carboprost Tromethamine 250 Mcg/1 Ml Amp)  250 mcg IM 

ASDIRECTED PRN


   PRN Reason: Post Partum Hemorrhage


Docusate Sodium (Docusate Sodium 100 Mg Cap)  100 mg PO Q12H PRN


   PRN Reason: Constipation


   Last Admin: 21 23:38 Dose:  100 mg


   Documented by: 


Emollient Ointment (Lanolin 100% Cream 7 Gm Tube)  0 gm TOP ASDIRECTED PRN


   PRN Reason: Sore Nipples


   Last Admin: 21 23:37 Dose:  7 gm


   Documented by: 


Oxytocin/Sodium Chloride (Oxytocin 30 Unit/500 Ml-Ns)  30 unit in 500 mls @ 500 

mls/hr IV TITRATE [facility]


   Last Admin: 21 20:05 Dose:  500 mls/hr


   Documented by: 


Lactated Ringer's (Ringers, Lactated)  1,000 mls @ 150 mls/hr IV ASDIRECTED [facility]


   Last Infusion: 21 18:00 Dose:  150 mls/hr


   Documented by: 


Ibuprofen (Ibuprofen 400 Mg Tab)  400 mg PO Q4H PRN


   PRN Reason: Pain (mild 1-3)


Ibuprofen (Ibuprofen 800 Mg Tab)  800 mg PO Q6H PRN


   PRN Reason: Pain (mild 1-3)


   Last Admin: 21 05:59 Dose:  800 mg


   Documented by: 


Lidocaine HCl (Lidocaine 1% 50 Ml Mdv)  50 ml INJECT ONETIME PRN


   PRN Reason: Laceration repair


Oxycodone HCl (Oxycodone 5 Mg Tab)  5 mg PO Q2H PRN


   PRN Reason: Pain (severe 7-10)


Sodium Chloride (Sodium Chloride 0.9% 10 Ml Syringe)  10 ml FLUSH ASDIRECTED PRN


   PRN Reason: Keep Vein Open


   Last Admin: 21 23:41 Dose:  10 ml


   Documented by: 


Sodium Chloride (Sodium Chloride 0.9% 2.5 Ml Syringe)  2.5 ml FLUSH ASDIRECTED 

PRN


   PRN Reason: Keep Vein Open


Sodium Chloride (Sodium Chloride 0.9% 10 Ml Sdv)  10 ml IV ASDIRECTED PRN


   PRN Reason: IV Use


Sterile Water (Water For Irrigation,Sterile 1,000 Ml Container)  1,000 ml IRR 

ASDIRECTED PRN


   PRN Reason: delivery


Witch Hazel (Witch Hazel Medicated Pads 40/Jar)  1 pad TOP ASDIRECTED PRN


   PRN Reason: comfort care


   Last Admin: 21 23:38 Dose:  1 pad


   Documented by: 





Discontinued Medications





Ampicillin Sodium (Ampicillin 2 Gm Vial) Confirm Administered Dose 2 gm .ROUTE 

.STK-MED ONE


   Stop: 21 14:50


Fentanyl (Fentanyl 100 Mcg/2 Ml Sdv) Confirm Administered Dose 200 mcg .ROUTE 

.STK-MED ONE


   Stop: 21 15:56


Tranexamic Acid 1,000 mg/ (Sodium Chloride)  110 mls @ 660 mls/hr IV ONETIME PRN


   PRN Reason: Bleeding


Ampicillin Sodium 2 gm/ Sodium (Chloride)  100 mls @ 200 mls/hr IV ONETIME ONE


   Stop: 21 14:51


   Last Admin: 21 14:50 Dose:  200 mls/hr


   Documented by: 


Ropivacaine (Naropin 0.2%) Confirm Administered Dose 200 mls @ as directed 

.ROUTE .STK-MED ONE


   Stop: 21 15:56


Ampicillin Sodium 1 gm/ Sodium (Chloride)  50 mls @ 100 mls/hr IV Q4H [facility]


   Last Admin: 21 18:52 Dose:  100 mls/hr


   Documented by: 


Methylergonovine Maleate (Methylergonovine 0.2 Mg/1 Ml Amp)  0.2 mg IM 

ASDIRECTED PRN


   PRN Reason: Post Partum Hemorrhage


Misoprostol (Misoprostol 200 Mcg Tab)  200 mcg PO ONETIME PRN


   PRN Reason: Post Partum Hemorrhage


Nalbuphine HCl (Nalbuphine 10 Mg/1 Ml Vial)  10 mg IVPUSH Q1H PRN


   PRN Reason: Pain (severe 7-10)











- Infant Interaction


Infant Disposition, Postpartum:  in Room with Family


Infant Interaction: Holding Infant


Infant Feeding: Attempted Breastfeeding; Nursed Fair/Poor


Support Person: Significant Other





- Postpartum Recovery Exam


Fundal Tone: Firm


Fundal Level: At Umbilicus


Fundal Placement: Midline


Lochia Amount: Scant


Lochia Color: Rubra/Red


Perineum Description: Other (see below)


Other Perinuem Description: labial tear repaired


Episiotomy/Laceration: Approximated


Bladder Status: Voiding


Urinary Elimination: Voided





- Exam


General: Alert


Lungs: Normal Respiratory Effort


Cardiovascular: Regular Rate


GI/Abdominal Exam: Soft, Non-Tender


Extremities: Normal Inspection, Normal Range of Motion, Non-Tender, No Pedal 

Edema


Skin: Warm, Dry, Intact


Neurological: No New Focal Deficit


Psy/Mental Status: Normal Mood





- Problem List Review


Problem List Initiated/Reviewed/Updated: Yes





- Assessment


Assessment:: 


19yo  PPD1s/p  PPD0





- Plan


Plan:: 





Routine postpartum cares


* Rh negative, received Rhogam x3 during pregnancy due to 1st trimester bleeding


* Rubella immune


* GBS positive, receive Ampicillin during labor


* PO pain medications ordered PRN


* Encourage ambulation and fluid intake


* Regular diet as tolerated


* Breastfeeding, nursing assistance PRN


Dispo: stable. Anticipate discharge 24-48hrs post delivery pending 

maternal/infant status. Continue cares today.

## 2021-06-09 NOTE — PCM.PNPP
- General Info


Date of Service: 21


Admission Dx/Problem (Free Text): 





Pregnanc


Subjective Update: 





Sitting up in chair nursing infant during rounds. Pain well controlled. 

Ambulating and voiding without difficulty. Lochia decreasing. Tolerating regular

diet. Attempting to breastfeed, going well.





- General Info


Date of Service: 21





- Patient Data


Vital Signs - Most Recent: 


                                Last Vital Signs











Temp  97.3 F   21 08:00


 


Pulse  89   21 08:00


 


Resp  18   21 08:00


 


BP  105/62   21 08:00


 


Pulse Ox  98   21 08:00











Weight - Most Recent: 128 lb


Med Orders - Current: 


                               Current Medications





Acetaminophen (Acetaminophen 500 Mg Tab)  500 mg PO Q4H PRN


   PRN Reason: Pain (mild 1-3)


Acetaminophen (Acetaminophen 500 Mg Tab)  1,000 mg PO Q4H PRN


   PRN Reason: Pain (mild 1-3)


Benzocaine/Menthol (Benzocaine/Menthol 20%-0.5% Spray 78 Gm Cannister)  78 gm 

TOP ASDIRECTED PRN


   PRN Reason: Perineal Comfort Measure


   Last Admin: 21 23:38 Dose:  78 gm


   Documented by: 


Bisacodyl (Bisacodyl 10 Mg Supp)  10 mg RECTAL ONETIME PRN


   PRN Reason: Constipation


Butorphanol Tartrate (Butorphanol 1 Mg/Ml Sdv)  1 mg IVPUSH Q1H PRN


   PRN Reason: Pain (severe 7-10)


Carboprost Tromethamine (Carboprost Tromethamine 250 Mcg/1 Ml Amp)  250 mcg IM 

ASDIRECTED PRN


   PRN Reason: Post Partum Hemorrhage


Docusate Sodium (Docusate Sodium 100 Mg Cap)  100 mg PO Q12H PRN


   PRN Reason: Constipation


   Last Admin: 21 23:38 Dose:  100 mg


   Documented by: 


Emollient Ointment (Lanolin 100% Cream 7 Gm Tube)  0 gm TOP ASDIRECTED PRN


   PRN Reason: Sore Nipples


   Last Admin: 21 23:37 Dose:  7 gm


   Documented by: 


Oxytocin/Sodium Chloride (Oxytocin 30 Unit/500 Ml-Ns)  30 unit in 500 mls @ 500 

mls/hr IV TITRATE [facility]


   Last Admin: 21 20:05 Dose:  500 mls/hr


   Documented by: 


Lactated Ringer's (Ringers, Lactated)  1,000 mls @ 150 mls/hr IV ASDIRECTED [facility]


   Last Infusion: 21 18:00 Dose:  150 mls/hr


   Documented by: 


Ibuprofen (Ibuprofen 400 Mg Tab)  400 mg PO Q4H PRN


   PRN Reason: Pain (mild 1-3)


Ibuprofen (Ibuprofen 800 Mg Tab)  800 mg PO Q6H PRN


   PRN Reason: Pain (mild 1-3)


   Last Admin: 21 23:28 Dose:  800 mg


   Documented by: 


Lidocaine HCl (Lidocaine 1% 50 Ml Mdv)  50 ml INJECT ONETIME PRN


   PRN Reason: Laceration repair


Oxycodone HCl (Oxycodone 5 Mg Tab)  5 mg PO Q2H PRN


   PRN Reason: Pain (severe 7-10)


Sodium Chloride (Sodium Chloride 0.9% 10 Ml Syringe)  10 ml FLUSH ASDIRECTED PRN


   PRN Reason: Keep Vein Open


   Last Admin: 21 23:41 Dose:  10 ml


   Documented by: 


Sodium Chloride (Sodium Chloride 0.9% 2.5 Ml Syringe)  2.5 ml FLUSH ASDIRECTED 

PRN


   PRN Reason: Keep Vein Open


Sodium Chloride (Sodium Chloride 0.9% 10 Ml Sdv)  10 ml IV ASDIRECTED PRN


   PRN Reason: IV Use


Sterile Water (Water For Irrigation,Sterile 1,000 Ml Container)  1,000 ml IRR 

ASDIRECTED PRN


   PRN Reason: delivery


Witch Hazel (Witch Hazel Medicated Pads 40/Jar)  1 pad TOP ASDIRECTED PRN


   PRN Reason: comfort care


   Last Admin: 21 23:38 Dose:  1 pad


   Documented by: 





Discontinued Medications





Ampicillin Sodium (Ampicillin 2 Gm Vial) Confirm Administered Dose 2 gm .ROUTE 

.STK-MED ONE


   Stop: 21 14:50


   Last Admin: 21 10:24 Dose:  Not Given


   Documented by: 


Fentanyl (Fentanyl 100 Mcg/2 Ml Sdv) Confirm Administered Dose 200 mcg .ROUTE 

.STK-MED ONE


   Stop: 21 15:56


   Last Admin: 21 10:24 Dose:  Not Given


   Documented by: 


Tranexamic Acid 1,000 mg/ (Sodium Chloride)  110 mls @ 660 mls/hr IV ONETIME PRN


   PRN Reason: Bleeding


Ampicillin Sodium 2 gm/ Sodium (Chloride)  100 mls @ 200 mls/hr IV ONETIME ONE


   Stop: 21 14:51


   Last Admin: 21 14:50 Dose:  200 mls/hr


   Documented by: 


Ropivacaine (Naropin 0.2%) Confirm Administered Dose 200 mls @ as directed 

.ROUTE .STK-MED ONE


   Stop: 21 15:56


   Last Admin: 21 10:24 Dose:  Not Given


   Documented by: 


Ampicillin Sodium 1 gm/ Sodium (Chloride)  50 mls @ 100 mls/hr IV Q4H [facility]


   Last Admin: 21 18:52 Dose:  100 mls/hr


   Documented by: 


Methylergonovine Maleate (Methylergonovine 0.2 Mg/1 Ml Amp)  0.2 mg IM 

ASDIRECTED PRN


   PRN Reason: Post Partum Hemorrhage


Misoprostol (Misoprostol 200 Mcg Tab)  200 mcg PO ONETIME PRN


   PRN Reason: Post Partum Hemorrhage


Nalbuphine HCl (Nalbuphine 10 Mg/1 Ml Vial)  10 mg IVPUSH Q1H PRN


   PRN Reason: Pain (severe 7-10)











- Infant Interaction


Infant Disposition, Postpartum: [place] in Room with Family


Infant Interaction: Holding Infant


Infant Feeding: Attempted Breastfeeding; Nursed Fair/Poor


Support Person: Significant Other





- Postpartum Recovery Exam


Fundal Tone: Firm


Fundal Level: 1 Fingerbreadths Below Umbilicus


Fundal Placement: Midline


Lochia Amount: Scant


Lochia Color: Rubra/Red


Perineum Description: Intact, Minimal Bruising/Swelling


Other Perinuem Description: labial tear repaired


Episiotomy/Laceration: None


Bladder Status: Voiding


Urinary Elimination: Voided





- Exam


General: Alert


Lungs: Normal Respiratory Effort


Cardiovascular: Regular Rate


GI/Abdominal Exam: Soft


Extremities: Normal Range of Motion, Non-Tender, No Pedal Edema


Skin: Warm, Dry, Intact


Neurological: No New Focal Deficit


Psy/Mental Status: Normal Mood





- Problem List Review


Problem List Initiated/Reviewed/Updated: Yes





- Assessment


Assessment:: 


19yo  PPD2 s/p  





- Plan


Plan:: 





Routine postpartum cares


* Rh negative, received Rhogam x3 during pregnancy due to 1st trimester bleeding


* Rubella immune


* GBS positive, receive Ampicillin during labor


* PO pain medications ordered PRN


* Encourage ambulation and fluid intake


* Regular diet as tolerated


* Breastfeeding, nursing assistance PRN


* 


Dispo: stable. Anticipate discharge today pending maternal/infant status. Rx for

breast pump provided and discharge instructions reviewed. Patient to return to 

clinic in 4 weeks for PPV.

## 2021-06-09 NOTE — OR
SURGEON:

KRISTINE PHILLIP

 

DATE OF PROCEDURE:  2021

 

PREOPERATIVE DIAGNOSES:

20-year-old, G1, P0, full-term pregnancy, in early labor.

 

POSTOPERATIVE DIAGNOSES:

20-year-old, G1, P0, full-term pregnancy, in early labor; O negative, group B

streptococcus positive.

 

PROCEDURE:

Normal spontaneous vaginal delivery.

 

ESTIMATED BLOOD LOSS:

Two hundred.

 

IV FLUIDS:

Pitocin running.

 

NOTES AND FINDINGS:

A live male  delivered at 20:04.  Apgar scores are 9 and 9.  Weight is

3430 g.  Term pregnancy at 38 weeks 5 days.

 

BRIEF HISTORY ABOUT THE PATIENT:

She is a 20-year-old, G1, P0, __________ O negative, received RhoGAM; GBS

positive, received ampicillin during labor; came in complaining of contractions.

When she came in, she was 4 cm dilated.  She was requesting epidural which she

got.  She received ampicillin. During her labor course, she was noted to have

__________ which recovered back to the baseline.  Afterwards, she had a normal

labor call.  She became fully dilated and she was encouraged to push.

 

DESCRIPTION OF PROCEDURE:

With good pushing effort which delivered the head, subsequently by the anterior

and posterior shoulder.  The body of the baby was delivered.  Infant was placed

on maternal abdomen.  Delayed cord clamping was observed and placenta was

delivered by controlled cord traction.  Perineum was inspected.  She was noted

to have a right labial laceration which was repaired with 3-0 Vicryl interrupted

stitch.  Uterus was noted to be contracted after the procedure.  She tolerated

the procedure well.  All instrument and pad counts were correct x2.

 

 

RICKY / JAYMIE

DD:  2021 13:29:38

DT:  2021 18:23:26

Job #:  [id number]

## 2021-08-23 ENCOUNTER — HOSPITAL ENCOUNTER (EMERGENCY)
Dept: HOSPITAL 56 - MW.ED | Age: 21
Discharge: HOME | End: 2021-08-23
Payer: MEDICAID

## 2021-08-23 DIAGNOSIS — Z20.822: ICD-10-CM

## 2021-08-23 DIAGNOSIS — B34.9: Primary | ICD-10-CM

## 2021-08-23 PROCEDURE — U0002 COVID-19 LAB TEST NON-CDC: HCPCS

## 2021-08-23 NOTE — EDM.PDOC
ED HPI GENERAL MEDICAL PROBLEM





- General


Chief Complaint: Respiratory Problem


Stated Complaint: SORE THROAT, COUGH, HEADACHE


Time Seen by Provider: 08/23/21 10:46





- History of Present Illness


INITIAL COMMENTS - FREE TEXT/NARRATIVE: 





Patient presents with cough and headache and sore throat 4 days in duration.  No

fevers.  Patient received 1 dose of the Covid vaccine.  Moderate symptoms.  No 

exacerbating or alleviating factors.  No shortness of breath or productive 

sputum


  ** head pain


Pain Score (Numeric/FACES): 4





- Related Data


                                    Allergies











Allergy/AdvReac Type Severity Reaction Status Date / Time


 


No Known Allergies Allergy   Verified 08/23/21 10:57











Home Meds: 


                                    Home Meds





. [No Known Home Meds]  10/13/20 [History]


Pnv No.95/Ferrous Fum/Folic AC [Prenatal Multivitamin Tablet] 1 each PO DAILY 

03/17/21 [History]











Past Medical History





- Past Health History


Medical/Surgical History: Denies Medical/Surgical History


OB/GYN History: Reports: Pregnancy





- Infectious Disease History


Infectious Disease History: Reports: None





Social & Family History





- Family History


Family Medical History: No Pertinent Family History





- Caffeine Use


Caffeine Use: Reports: None





ED ROS GENERAL





- Review of Systems


Review Of Systems: See Below


Constitutional: Denies: Fever


HEENT: Reports: Other (Sore throat and headache)


Respiratory: Reports: Cough.  Denies: Shortness of Breath


Cardiovascular: Denies: Chest Pain


GI/Abdominal: Denies: Abdominal Pain, Diarrhea, Vomiting


: Reports: No Symptoms


Musculoskeletal: Reports: No Symptoms


Skin: Denies: Rash


Neurological: Reports: No Symptoms





ED EXAM, GENERAL





- Physical Exam


Exam: See Below


Free Text/Narrative:: 





CONSTITUTIONAL: well appearing in no acute distress


SKIN: Warm, dry, and intact without rash


HENT: Normocephalic, atraumatic,


PULMONARY: clear to ausculation bilaterally. No rales, rhonchi, wheezing


CARDIOVASCULAR: regular rate, No murmur, rubs, or gallops


GASTROINTESTINAL: soft, nondistended, nontender


NEUROLOGIC: normal speech, motor and sensory function grossly intact


MUSCULOSKELETAL: no gross deformities, atraumatic


PSYCHIATRIC: normal mood and affect





Course





- Vital Signs


Text/Narrative:: 





Differential diagnosis: Influenza, Covid, viral syndrome, other.  Supportive 

treatment with return precautions and pcp followup.


Last Recorded V/S: 


                                Last Vital Signs











Temp  36.7 C   08/23/21 10:57


 


Pulse  76   08/23/21 13:01


 


Resp      


 


BP  114/70   08/23/21 13:01


 


Pulse Ox  98   08/23/21 13:01














- Orders/Labs/Meds


Orders: 


                               Active Orders 24 hr











 Category Date Time Status


 


 Isolation [COMM] Routine Oth  08/23/21 10:57 Active











Labs: 


                                Laboratory Tests











  08/23/21 Range/Units





  11:05 


 


SARS-CoV-2 RNA ([place])  NEGATIVE  (NEGATIVE)  














Departure





- Departure


Time of Disposition: 12:44


Disposition: Home, Self-Care 01


Condition: Good


Clinical Impression: 


 Viral illness








- Discharge Information


Instructions:  Viral Illness, Adult


Referrals: 


PCP,None [Primary Care Provider] - 


Forms:  ED Department Discharge


Additional Instructions: 


Regresa si te falta el aire o te empeora la condicion. Ve a jensen el doctor 

regular en 3-5 sue para un reviso (return if you are short of breath or your 

condition worsens.  Follow-up with  In 3 to 5 days for reevaluation).

















------------------------------------------------------------------------

------------------





The following information is given to patients seen in the emergency department 

who are being discharged to home. This information is to outline your options 

for follow-up care. We provide all patients seen in our emergency department 

with a follow-up referral.





The need for follow-up, as well as the timing and circumstances, are variable 

depending upon the specifics of your emergency department visit.





If you don't have a primary care physician on staff, we will provide you with a 

referral. We always advise you to contact your personal physician following an 

emergency department visit to inform them of the circumstance of the visit and 

for follow-up with them and/or the need for any referrals to a consulting 

specialist.





The emergency department will also refer you to a specialist when appropriate. 

This referral assures that you have the opportunity for follow-up care with a 

specialist. All of these measure are taken in an effort to provide you with 

optimal care, which includes your follow-up.











Primary care clinics in the area:





[facility]


[address] [place] [address]


Phone: [phone]


Fax: [phone]








[facility]


[address] [place] [address]


Phone: [phone]


Fax: [phone]





Under all circumstances we always encourage you to contact your private 

physician who remains a resource for coordinating your care. When calling for 

follow-up care, please make the office aware that this follow-up is from your 

recent emergency room visit. If for any reason you are refused follow-up, please

contact the [facility] Emergency Department

at [phone] and asked to speak to the emergency department charge nurse.








Sepsis Event Note (ED)





- Focused Exam


Vital Signs: 


                                   Vital Signs











  Temp Pulse BP Pulse Ox


 


 08/23/21 13:01   76  114/70  98


 


 08/23/21 11:04    100/71 


 


 08/23/21 10:57  36.7 C  141 H   100














- My Orders


Last 24 Hours: 


My Active Orders





08/23/21 10:57


Isolation [COMM] Routine 














- Assessment/Plan


Last 24 Hours: 


My Active Orders





08/23/21 10:57


Isolation [COMM] Routine